# Patient Record
Sex: MALE | Race: WHITE | Employment: UNEMPLOYED | URBAN - METROPOLITAN AREA
[De-identification: names, ages, dates, MRNs, and addresses within clinical notes are randomized per-mention and may not be internally consistent; named-entity substitution may affect disease eponyms.]

---

## 2017-12-04 PROBLEM — E46 PROTEIN-CALORIE MALNUTRITION (HCC): Status: ACTIVE | Noted: 2017-12-04

## 2017-12-04 PROBLEM — L40.9 PSORIASIS: Status: ACTIVE | Noted: 2017-12-04

## 2017-12-04 PROBLEM — F17.200 SMOKER: Status: ACTIVE | Noted: 2017-12-04

## 2017-12-04 PROBLEM — B18.2 CHRONIC HEPATITIS C WITHOUT HEPATIC COMA (HCC): Status: ACTIVE | Noted: 2017-12-04

## 2017-12-04 PROBLEM — J44.9 CHRONIC OBSTRUCTIVE PULMONARY DISEASE (HCC): Status: ACTIVE | Noted: 2017-12-04

## 2017-12-04 PROBLEM — Z91.199 MEDICALLY NONCOMPLIANT: Status: ACTIVE | Noted: 2017-12-04

## 2019-03-18 ENCOUNTER — OFFICE VISIT (OUTPATIENT)
Dept: FAMILY MEDICINE CLINIC | Age: 69
End: 2019-03-18
Payer: COMMERCIAL

## 2019-03-18 VITALS
OXYGEN SATURATION: 98 % | WEIGHT: 156 LBS | DIASTOLIC BLOOD PRESSURE: 69 MMHG | BODY MASS INDEX: 20.67 KG/M2 | HEIGHT: 73 IN | RESPIRATION RATE: 14 BRPM | HEART RATE: 83 BPM | SYSTOLIC BLOOD PRESSURE: 110 MMHG

## 2019-03-18 DIAGNOSIS — J44.9 CHRONIC OBSTRUCTIVE PULMONARY DISEASE, UNSPECIFIED COPD TYPE (HCC): Primary | ICD-10-CM

## 2019-03-18 DIAGNOSIS — B18.2 CHRONIC HEPATITIS C WITHOUT HEPATIC COMA (HCC): ICD-10-CM

## 2019-03-18 DIAGNOSIS — Z91.199 MEDICALLY NONCOMPLIANT: ICD-10-CM

## 2019-03-18 DIAGNOSIS — F17.200 SMOKER: ICD-10-CM

## 2019-03-18 DIAGNOSIS — L40.9 PSORIASIS: ICD-10-CM

## 2019-03-18 DIAGNOSIS — F31.2 SEVERE MANIC BIPOLAR I DISORDER WITH PSYCHOTIC FEATURES (HCC): Chronic | ICD-10-CM

## 2019-03-18 PROCEDURE — 99214 OFFICE O/P EST MOD 30 MIN: CPT | Performed by: FAMILY MEDICINE

## 2019-03-18 RX ORDER — MOMETASONE FUROATE 1 MG/G
CREAM TOPICAL
Qty: 60 G | Refills: 2 | Status: SHIPPED | OUTPATIENT
Start: 2019-03-18 | End: 2022-01-27

## 2019-03-18 RX ORDER — ARIPIPRAZOLE 10 MG/1
TABLET ORAL
Refills: 2 | COMMUNITY
Start: 2019-03-08

## 2019-03-18 ASSESSMENT — ENCOUNTER SYMPTOMS
COLOR CHANGE: 0
TROUBLE SWALLOWING: 0
FACIAL SWELLING: 0
ABDOMINAL DISTENTION: 0
SINUS PRESSURE: 0
NAUSEA: 0
WHEEZING: 0
ABDOMINAL PAIN: 0
SHORTNESS OF BREATH: 0
ANAL BLEEDING: 0
DIARRHEA: 0
BACK PAIN: 0
EYE DISCHARGE: 0
SORE THROAT: 0
CONSTIPATION: 0
APNEA: 0
CHEST TIGHTNESS: 0
COUGH: 1
PHOTOPHOBIA: 0
VOMITING: 0
EYE PAIN: 0

## 2019-03-18 ASSESSMENT — PATIENT HEALTH QUESTIONNAIRE - PHQ9
SUM OF ALL RESPONSES TO PHQ9 QUESTIONS 1 & 2: 0
SUM OF ALL RESPONSES TO PHQ QUESTIONS 1-9: 0
SUM OF ALL RESPONSES TO PHQ QUESTIONS 1-9: 0
1. LITTLE INTEREST OR PLEASURE IN DOING THINGS: 0
2. FEELING DOWN, DEPRESSED OR HOPELESS: 0

## 2019-05-30 ENCOUNTER — OFFICE VISIT (OUTPATIENT)
Dept: DERMATOLOGY | Age: 69
End: 2019-05-30
Payer: COMMERCIAL

## 2019-05-30 VITALS
WEIGHT: 158 LBS | BODY MASS INDEX: 20.94 KG/M2 | OXYGEN SATURATION: 98 % | DIASTOLIC BLOOD PRESSURE: 87 MMHG | HEIGHT: 73 IN | HEART RATE: 73 BPM | SYSTOLIC BLOOD PRESSURE: 138 MMHG

## 2019-05-30 DIAGNOSIS — L40.9 PSORIASIS: Primary | ICD-10-CM

## 2019-05-30 PROCEDURE — 99203 OFFICE O/P NEW LOW 30 MIN: CPT | Performed by: DERMATOLOGY

## 2019-05-30 RX ORDER — BETAMETHASONE DIPROPIONATE 0.5 MG/G
OINTMENT TOPICAL
Qty: 45 G | Refills: 3 | Status: SHIPPED | OUTPATIENT
Start: 2019-05-30 | End: 2019-06-29

## 2019-05-31 NOTE — PROGRESS NOTES
Adult)   Pulse 73   Ht 6' 1\" (1.854 m)   Wt 158 lb (71.7 kg)   SpO2 98%   BMI 20.85 kg/m²     General Exam:  General Appearance: No acute distress, Well nourished     Neuro: Alert and oriented to person, place and time  Psych: Normal affect   Lymph Node: Not performed    Cutaneous Exam: Performed as documented in clinic note below. Full skin,which includes the head/face, neck, both arms, chest, back, abdomen, both legs, genitalia and/or groin and/or buttocks, digits and/or nails, was examined. Pertinent Physical Exam Findings:  Physical Exam   Skin:   Psoriasiform plaques on the elbows, knees, thighs, lower legs and buttocks x 10% BSA       Medical Necessity of Exam Performed:   Widespread Rash    Additional Diagnostic Testing performed during exam: Not performed ,  Not performed    ASSESSMENT:   Diagnosis Orders   1. Psoriasis         Plan of Action is as Follows:  Assessment 1. Psoriasis  - Moderate to severe and has failed multiple topicals. Denies active infections or malignancy. Denies FH of demyelinating disease.   - Based on r/b and past failures discussed Humira including the r/b of biologic medications, including, but not limited to, increased risk of infections (notably reactivation of tuberculosis), transaminitis, demyelinating disease, lupus-like syndrome, and possible increased risk of malignancy - patient is interested in this therapy. Based on the patient's coverage through the [place] and the expense of biologics I discussed that the most cost effective way for the patient to obtain Humira would likely be through the [place] and I provided the patient with a letter to bring to his [place] physician.  Patient is agreeable to this plan  - In the interim diprolene ointment BID to active psoriasis on the body            Patient Instructions   To Whom it may Concern,    Falguni Coburn was seen by Alycia Saini on 05/30/19 for psoriasis - he has had psoriasis since 1988 and been on numerous topical medications without adequate relief. He has a h/o hepatitis C that has been treated with Deanna Maryanne, but otherwise denies any chronic infections or known malignancy. He also denies any personal or FH of multiple sclerosis. In a patient with long standing psoriasis without adequate relief from topical medications systemic immunomodulator's are the next line treatment. He is not a candidate for methotrexate due to the history of Hepatitis C. He is a candidate for Humira. Humira is a TNF alpha inhibitor that is very effective at controlling psoriasis. Humira is on the "[employer] 49, but is not on the patients private formulary. I believe Cristobal Whitlock would benefit from this medication. If you are agreeable he would need CBC, CMP, Quant Gold, HIV and negative Hepatitis B testing prior to starting Humira. To start he would need 80 mg at day 0, 40 mg at day 7 and then 40 mg every 2 weeks. If you as his [address] [place] [address] provider are willing to prescribe this or refer him within the [address] [place] [address] to provider that will I think he would greatly benefit. I am happy to follow along as well if desired and happy to discuss if needed [phone],    Thank You,    [address] Dermatology      Photo surveillance performed: No    Follow-up: PRN    This note was created with the assistance of aspeech-recognition program.  Although the intention is to generate a document that actually reflects thecontent of the visit, no guarantees can be provided that every mistake has been identified and corrected by editing.     Electronically signed by Jenine Meckel, MD on 5/31/19 at 1:02 PM

## 2019-08-14 LAB
ALBUMIN SERPL-MCNC: NORMAL G/DL
ALP BLD-CCNC: NORMAL U/L
ALT SERPL-CCNC: NORMAL U/L
ANION GAP SERPL CALCULATED.3IONS-SCNC: NORMAL MMOL/L
AST SERPL-CCNC: NORMAL U/L
BASOPHILS ABSOLUTE: NORMAL /ΜL
BASOPHILS RELATIVE PERCENT: NORMAL %
BILIRUB SERPL-MCNC: NORMAL MG/DL (ref 0.1–1.4)
BUN BLDV-MCNC: NORMAL MG/DL
CALCIUM SERPL-MCNC: NORMAL MG/DL
CHLORIDE BLD-SCNC: NORMAL MMOL/L
CHOLESTEROL, TOTAL: 147 MG/DL
CHOLESTEROL/HDL RATIO: 3.3
CO2: NORMAL MMOL/L
CREAT SERPL-MCNC: NORMAL MG/DL
EOSINOPHILS ABSOLUTE: NORMAL /ΜL
EOSINOPHILS RELATIVE PERCENT: NORMAL %
GFR CALCULATED: NORMAL
GLUCOSE BLD-MCNC: NORMAL MG/DL
HCT VFR BLD CALC: NORMAL % (ref 41–53)
HDLC SERPL-MCNC: 44 MG/DL (ref 35–70)
HEMOGLOBIN: NORMAL G/DL (ref 13.5–17.5)
LDL CHOLESTEROL CALCULATED: 77 MG/DL (ref 0–160)
LYMPHOCYTES ABSOLUTE: NORMAL /ΜL
LYMPHOCYTES RELATIVE PERCENT: NORMAL %
MCH RBC QN AUTO: NORMAL PG
MCHC RBC AUTO-ENTMCNC: NORMAL G/DL
MCV RBC AUTO: NORMAL FL
MONOCYTES ABSOLUTE: NORMAL /ΜL
MONOCYTES RELATIVE PERCENT: NORMAL %
NEUTROPHILS ABSOLUTE: NORMAL /ΜL
NEUTROPHILS RELATIVE PERCENT: NORMAL %
PDW BLD-RTO: NORMAL %
PLATELET # BLD: NORMAL K/ΜL
PMV BLD AUTO: NORMAL FL
POTASSIUM SERPL-SCNC: NORMAL MMOL/L
RBC # BLD: NORMAL 10^6/ΜL
SODIUM BLD-SCNC: NORMAL MMOL/L
TOTAL PROTEIN: NORMAL
TRIGL SERPL-MCNC: 129 MG/DL
VLDLC SERPL CALC-MCNC: 26 MG/DL
WBC # BLD: NORMAL 10^3/ML

## 2021-07-27 ENCOUNTER — OFFICE VISIT (OUTPATIENT)
Dept: DERMATOLOGY | Age: 71
End: 2021-07-27
Payer: COMMERCIAL

## 2021-07-27 VITALS
DIASTOLIC BLOOD PRESSURE: 81 MMHG | TEMPERATURE: 97.7 F | HEIGHT: 73 IN | HEART RATE: 73 BPM | SYSTOLIC BLOOD PRESSURE: 134 MMHG | WEIGHT: 153.8 LBS | OXYGEN SATURATION: 97 % | BODY MASS INDEX: 20.38 KG/M2

## 2021-07-27 DIAGNOSIS — L40.9 PSORIASIS: Primary | ICD-10-CM

## 2021-07-27 PROCEDURE — 99214 OFFICE O/P EST MOD 30 MIN: CPT | Performed by: DERMATOLOGY

## 2021-07-27 RX ORDER — FLUOCINONIDE TOPICAL SOLUTION USP, 0.05% 0.5 MG/ML
SOLUTION TOPICAL
Qty: 60 ML | Refills: 2 | Status: SHIPPED | OUTPATIENT
Start: 2021-07-27 | End: 2022-01-27 | Stop reason: SDUPTHER

## 2021-07-27 RX ORDER — BETAMETHASONE DIPROPIONATE 0.5 MG/G
OINTMENT TOPICAL
Qty: 50 G | Refills: 5 | Status: SHIPPED | OUTPATIENT
Start: 2021-07-27 | End: 2021-08-26

## 2021-07-27 NOTE — PROGRESS NOTES
Dermatology Patient Note  [facility] [address]  [address]  [address]  Dept: [phone]  Dept Fax: [phone]      VISITDATE: 7/27/2021   REFERRING PROVIDER: No ref. provider found      Glena Merlin is a 79 y.o. male  who presents today in the office for:    Follow-up (F/U psoriasis,prev pr of Dr Светлана Alcantara- ran diprolene. When he uses, it clears psoriasis. Only area that he has trouble with is scalp)      HISTORY OF PRESENT ILLNESS:  Patient did not fill Humira that was recommended by Dr. Светлана Alcantara for 6 months. He states that he does not want to do injections. He currently has active psoriasis patches on his lower extremities, forearms, back, and scalp. He denies areas on his groins. He states that it clears when using diprolene. He last used the diprolene a couple of weeks ago. Patient denies arthralgias. MEDICAL PROBLEMS:  Patient Active Problem List    Diagnosis Date Noted    Chronic obstructive pulmonary disease ([facility] 12/04/2017    Chronic hepatitis C without hepatic coma ([facility] 12/04/2017    Psoriasis 12/04/2017    Medically noncompliant 12/04/2017    Smoker 12/04/2017    Protein-calorie malnutrition ([facility] 12/04/2017    Severe manic bipolar I disorder with psychotic features ([facility] 08/26/2014       CURRENT MEDICATIONS:   Current Outpatient Medications   Medication Sig Dispense Refill    augmented betamethasone dipropionate (DIPROLENE-AF) 0.05 % ointment Apply to affected areas twice daily. Avoid face, armpits, groin 50 g 5    fluocinonide (LIDEX) 0.05 % external solution Apply to scalp daily for rash 60 mL 2    ARIPiprazole (ABILIFY) 10 MG tablet TAKE 1 TABLET BY MOUTH ONE TIME A DAY  2    mometasone (ELOCON) 0.1 % cream Apply topically daily. (Patient not taking: Reported on 7/27/2021) 60 g 2     No current facility-administered medications for this visit.        ALLERGIES:   No Known Allergies    SOCIAL HISTORY:  Social History     Tobacco Use  Smoking status: Current Every Day Smoker     Types: Cigarettes    Smokeless tobacco: Never Used   Substance Use Topics    Alcohol use: No       Pertinent ROS:  Review of Systems  Skin: Denies any new changing, growing or bleeding lesions or rashes except as described in the HPI   Constitutional: Denies fevers, chills, and malaise. PHYSICAL EXAM:   /81   Pulse 73   Temp 97.7 °F (36.5 °C)   Ht 6' 1\" (1.854 m)   Wt 153 lb 12.8 oz (69.8 kg)   SpO2 97%   BMI 20.29 kg/m²     The patient is generally well appearing, well nourished, alert and conversational. Affect is normal.    Cutaneous Exam:  Physical Exam  Focused exam of extremities and back was performed    Facial covering was not removed during examination. Diagnoses/exam findings/medical history pertinent to this visit are listed below:    Assessment:   Diagnosis Orders   1. Psoriasis  augmented betamethasone dipropionate (DIPROLENE-AF) 0.05 % ointment    fluocinonide (LIDEX) 0.05 % external solution        Plan:  Psoriasis, moderate with involvement of legs, forearms, scalp, waist, buttock  - chronic illness with progression and/or exacerbation   - patient declines biologics  -lidex solution daily for scalp  -refill diprolene - twice daily to affected areas until clear. Use very sparing in gluteal fold (patietn declined lower stregnth TCS for gluteal fold)    RTC 6 months    No future appointments. There are no Patient Instructions on file for this visit. This note was created with the assistance of a speech-recognition program.  Although the intention is to generate a document that actually reflects the content of the visit, no guarantees can be provided that every mistake has been identified and corrected by editing. I, Dr. Melodie Fleischer, personally performed the services described in this documentation, as scribed by [facility] in my presence, and it is both accurate and complete.      Electronically signed by Anh Yuan MD on 7/27/21 at 2:11 PM EDT

## 2022-01-27 ENCOUNTER — OFFICE VISIT (OUTPATIENT)
Dept: DERMATOLOGY | Age: 72
End: 2022-01-27
Payer: COMMERCIAL

## 2022-01-27 VITALS
TEMPERATURE: 97.9 F | WEIGHT: 153 LBS | OXYGEN SATURATION: 97 % | HEIGHT: 73 IN | HEART RATE: 81 BPM | SYSTOLIC BLOOD PRESSURE: 120 MMHG | DIASTOLIC BLOOD PRESSURE: 80 MMHG | BODY MASS INDEX: 20.28 KG/M2

## 2022-01-27 DIAGNOSIS — L40.9 PSORIASIS: Primary | ICD-10-CM

## 2022-01-27 PROCEDURE — 99213 OFFICE O/P EST LOW 20 MIN: CPT | Performed by: DERMATOLOGY

## 2022-01-27 RX ORDER — FLUOCINONIDE TOPICAL SOLUTION USP, 0.05% 0.5 MG/ML
SOLUTION TOPICAL
Qty: 60 ML | Refills: 11 | Status: SHIPPED | OUTPATIENT
Start: 2022-01-27

## 2022-01-27 RX ORDER — BETAMETHASONE DIPROPIONATE 0.5 MG/G
OINTMENT TOPICAL
Qty: 50 G | Refills: 11 | Status: SHIPPED | OUTPATIENT
Start: 2022-01-27

## 2022-01-27 RX ORDER — BETAMETHASONE DIPROPIONATE 0.5 MG/G
OINTMENT TOPICAL
COMMUNITY
Start: 2022-01-25

## 2022-01-27 NOTE — PROGRESS NOTES
Dermatology Patient Note  [address]  [place] [address]  Dept: [phone]  Dept Fax: [phone]      VISITDATE: 1/27/2022   REFERRING PROVIDER: No ref. provider found      Allison Piña is a 70 y.o. male  who presents today in the office for:    Follow-up (6 month psoriasis- patient states the psoriasis on the scalp is improving since his last appointment. States he is still using the lidex and diprolene  )      HISTORY OF PRESENT ILLNESS:  Pt has been using topicals and seeing progress. Patient denies joint pain. MEDICAL PROBLEMS:  Patient Active Problem List    Diagnosis Date Noted    Chronic obstructive pulmonary disease ([facility] 12/04/2017    Chronic hepatitis C without hepatic coma ([facility] 12/04/2017    Psoriasis 12/04/2017    Medically noncompliant 12/04/2017    Smoker 12/04/2017    Protein-calorie malnutrition ([facility] 12/04/2017    Severe manic bipolar I disorder with psychotic features ([facility] 08/26/2014       CURRENT MEDICATIONS:   Current Outpatient Medications   Medication Sig Dispense Refill    augmented betamethasone dipropionate (DIPROLENE-AF) 0.05 % ointment APPLY TO AFFECTED AREAS TOPICALLY TWO TIMES A DAY. AVOID FACE, ARMPITS, AND GROIN      augmented betamethasone dipropionate (DIPROLENE-AF) 0.05 % ointment Apply to rash twice daily until clear (not face, armpit, or groin) 50 g 11    fluocinonide (LIDEX) 0.05 % external solution Apply to scalp daily for rash 60 mL 11    ARIPiprazole (ABILIFY) 10 MG tablet TAKE 1 TABLET BY MOUTH ONE TIME A DAY  2     No current facility-administered medications for this visit. ALLERGIES:   Allergies   Allergen Reactions    Terbinafine Nausea And Vomiting       SOCIAL HISTORY:  Social History     Tobacco Use    Smoking status: Current Every Day Smoker     Types: Cigarettes    Smokeless tobacco: Never Used   Substance Use Topics    Alcohol use:  No Pertinent ROS:  Review of Systems  Skin: Denies any new changing, growing or bleeding lesions or rashes except as described in the HPI   Constitutional: Denies fevers, chills, and malaise. PHYSICAL EXAM:   /80   Pulse 81   Temp 97.9 °F (36.6 °C)   Ht 6' 1\" (1.854 m)   Wt 153 lb (69.4 kg)   SpO2 97%   BMI 20.19 kg/m²     The patient is generally well appearing, well nourished, alert and conversational. Affect is normal.    Cutaneous Exam:  Physical Exam  Waist-up + limited LEs: Head/face,neck, both arms, chest, back, abdomen, digits and/or nails, and legs visible with pants/shorts and shoes/socks on was examined. Facial covering was not removed during examination. Diagnoses/exam findings/medical history pertinent to this visit are listed below:    Assessment:   Diagnosis Orders   1. Psoriasis  augmented betamethasone dipropionate (DIPROLENE-AF) 0.05 % ointment    fluocinonide (LIDEX) 0.05 % external solution        Plan:  Psoriasis, moderate with involvement of lower extremities, elbows, scalp  - stable chronic illness  - continue diprolene and lidex solution as needed to keep skin clear  Counseled on potential side effects of topical corticosteroids including but not limited to skin thinning, and atrophy. Patient instructed to use medication only on affected skin and to stop application once symptoms resolve or until rash clears.    - offered referral to rheumatology due to possible swelling of joints of hands, patient states he has been evaluated by rheum at the [place] and did not of PsA      RTC 1 year    Future Appointments   Date Time Provider Eliezer Love   1/30/2023  1:00 PM Shahnaz Winters MD  [facility]m [facility]         Patient Instructions   - stable chronic illness  - continue diprolene  - Consider referral to rheumatology at the next visit      This note was created with the assistance of a speech-recognition program.  Although the intention is to generate a document that actually reflects the content of the visit, no guarantees can be provided that every mistake has been identified and corrected by editing.     Electronically signed by Haider Doss MD on 1/27/22 at 12:55 PM EST

## 2022-01-27 NOTE — PATIENT INSTRUCTIONS
- stable chronic illness  - continue diprolene  - Consider referral to rheumatology at the next visit

## 2023-07-31 ENCOUNTER — OFFICE VISIT (OUTPATIENT)
Dept: DERMATOLOGY | Age: 73
End: 2023-07-31
Payer: COMMERCIAL

## 2023-07-31 VITALS
TEMPERATURE: 97.3 F | BODY MASS INDEX: 20.19 KG/M2 | OXYGEN SATURATION: 97 % | SYSTOLIC BLOOD PRESSURE: 126 MMHG | HEIGHT: 73 IN | HEART RATE: 62 BPM | DIASTOLIC BLOOD PRESSURE: 79 MMHG

## 2023-07-31 DIAGNOSIS — L40.9 PSORIASIS: Primary | ICD-10-CM

## 2023-07-31 PROCEDURE — 1123F ACP DISCUSS/DSCN MKR DOCD: CPT | Performed by: DERMATOLOGY

## 2023-07-31 PROCEDURE — 99213 OFFICE O/P EST LOW 20 MIN: CPT | Performed by: DERMATOLOGY

## 2023-07-31 RX ORDER — FLUOCINONIDE TOPICAL SOLUTION USP, 0.05% 0.5 MG/ML
SOLUTION TOPICAL
Qty: 60 ML | Refills: 11 | Status: SHIPPED | OUTPATIENT
Start: 2023-07-31

## 2023-07-31 RX ORDER — BETAMETHASONE DIPROPIONATE 0.5 MG/G
OINTMENT TOPICAL
Qty: 50 G | Refills: 11 | Status: SHIPPED | OUTPATIENT
Start: 2023-07-31

## 2023-07-31 NOTE — PROGRESS NOTES
Dermatology Patient Note  [address]  [address] [place] [address] [address] [address]  Dept: [phone]  Dept Fax: [phone]      VISITDATE: 7/31/2023   REFERRING PROVIDER: No ref. provider found      Jesse Kessler is a 67 y.o. male  who presents today in the office for:    Psoriasis (Pt here for a follow up for his psorasis- states that he has not had a flare up in a while. Using lidex solution, and diprolene ointment. LV-01/27/22)      HISTORY OF PRESENT ILLNESS:  1.5 year follow up psoriasis. Patient reports improvement since last visit on 1/27/2022. He is stable on lidex and diprolene without arthralgias. Patient reports psoriatic plaques on the outside of his calves, his bilateral hips, and bilateral elbows. He is compliant with betamethasone ointment. He notices his nail pitting is improved. MEDICAL PROBLEMS:  Patient Active Problem List    Diagnosis Date Noted    Chronic obstructive pulmonary disease ([facility]) 12/04/2017    Chronic hepatitis C without hepatic coma ([address]) 12/04/2017    Psoriasis 12/04/2017    Medically noncompliant 12/04/2017    Smoker 12/04/2017    Protein-calorie malnutrition ([address]) 12/04/2017    Severe manic bipolar I disorder with psychotic features ([facility]) 08/26/2014       CURRENT MEDICATIONS:   Current Outpatient Medications   Medication Sig Dispense Refill    augmented betamethasone dipropionate (DIPROLENE-AF) 0.05 % ointment APPLY TO AFFECTED AREAS TOPICALLY TWO TIMES A DAY.  AVOID FACE, ARMPITS, AND GROIN (Patient not taking: Reported on 7/31/2023)      augmented betamethasone dipropionate (DIPROLENE-AF) 0.05 % ointment Apply to rash twice daily until clear (not face, armpit, or groin) 50 g 11    fluocinonide (LIDEX) 0.05 % external solution Apply to scalp daily for rash 60 mL 11    ARIPiprazole (ABILIFY) 10 MG tablet TAKE 1 TABLET BY MOUTH ONE TIME A DAY  2     No current facility-administered

## 2024-02-05 DIAGNOSIS — L40.9 PSORIASIS: ICD-10-CM

## 2024-02-05 RX ORDER — BETAMETHASONE DIPROPIONATE 0.5 MG/G
OINTMENT, AUGMENTED TOPICAL
Qty: 45 G | Refills: 0 | OUTPATIENT
Start: 2024-02-05

## 2024-02-06 DIAGNOSIS — L40.9 PSORIASIS: ICD-10-CM

## 2024-02-06 RX ORDER — BETAMETHASONE DIPROPIONATE 0.5 MG/G
OINTMENT, AUGMENTED TOPICAL
Qty: 50 G | Refills: 11 | Status: SHIPPED | OUTPATIENT
Start: 2024-02-06

## 2024-02-06 RX ORDER — FLUOCINONIDE TOPICAL SOLUTION USP, 0.05% 0.5 MG/ML
SOLUTION TOPICAL
Qty: 60 ML | Refills: 11 | Status: SHIPPED | OUTPATIENT
Start: 2024-02-06

## 2024-02-06 NOTE — TELEPHONE ENCOUNTER
Patient requesting refills on 2 medications     Future Appointments   Date Time Provider Department Center   7/30/2024  1:00 PM Jerica Rosario MD [facility]